# Patient Record
Sex: MALE | Race: WHITE
[De-identification: names, ages, dates, MRNs, and addresses within clinical notes are randomized per-mention and may not be internally consistent; named-entity substitution may affect disease eponyms.]

---
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
## 2021-08-29 NOTE — EDM.PDOC
ED HPI GENERAL MEDICAL PROBLEM





- General


Stated Complaint: Chest pain


Time Seen by Provider: 08/29/21 15:28





- History of Present Illness


INITIAL COMMENTS - FREE TEXT/NARRATIVE: 





Patient comes emergency department today from home with complaints of sharp sh

ooting stabbing heart pain that he has had for over a year.Patient has a history

of paranoid schizophrenia as well as antisocial personality disorder.  This 

patient for the last year has had intermittent episodes of sharp shooting 

stabbing midsternal anterior chest pain.  He has never been seen for this.  He 

sometimes has more pain like this when he goes to bed at night.  He noticed 

yesterday that he had this pain that was becoming a little bit more frequent.  

So after a year of having the symptoms for the first time he came to the 

emergency department today to have his sharp shooting stabbing chest pain 

evaluated that he has had for over a year.  He has no shortness of breath.  He 

only complains of some swelling to his lower extremities that he also has had 

for many years.  No SOB difficulty breathing. No fever no chills. No weakness 

dizziness lightheadedness. NO recent fall trauma or injury.  No cough or 

congestion.  No syncope.  No abdominal pain nausea or vomiting.  He denies any h

eartburn symptoms but his pain does get worse at night when he is laying down.  

He does have some chronic edema to his lower extremities that is not worse than 

normal.  He is somewhat difficult to obtain history on most likely due to his 

underlying psychiatric disorders.  He kind of ruminates about issues that have 

been going on for quite some time that are not new or acute.





- Related Data


                                    Allergies











Allergy/AdvReac Type Severity Reaction Status Date / Time


 


No Known Allergies Allergy   Verified 08/29/21 15:50











Home Meds: 


                                    Home Meds





Sertraline [Zoloft] 200 mg PO DAILY 11/20/20 [History]


busPIRone HCl [busPIRone] 30 mg PO BEDTIME 11/20/20 [History]


clonazePAM [Clonazepam] 1 mg PO TID 11/20/20 [History]


metFORMIN [Glucophage] 500 mg PO BIDMEALS 08/29/21 [History]











Past Medical History


Psychiatric History: Reports: Schizophrenia





Review of Systems





- Review of Systems


Review Of Systems: Comprehensive ROS is negative, except as noted in HPI.





ED EXAM, GENERAL





- Physical Exam


Exam: See Below


Exam Limited By: No Limitations


General Appearance: Alert, WD/WN, No Apparent Distress


Eye Exam: Bilateral Eye: EOMI


Head: Atraumatic, Normocephalic


Neck: Normal Inspection


Respiratory/Chest: No Respiratory Distress, Lungs Clear, Normal Breath Sounds, 

No Accessory Muscle Use, Chest Non-Tender (No signs of trauma bruising swelling 

ecchymosis of the chest)


Cardiovascular: Normal Peripheral Pulses, Regular Rate, Rhythm, No Murmur


Peripheral Pulses: 2+: Radial (L), Radial (R)


GI/Abdominal: Normal Bowel Sounds, Soft, Non-Tender


 (Male) Exam: Deferred


Rectal (Males) Exam: Deferred


Back Exam: Normal Inspection, Full Range of Motion


Extremities: Normal Inspection, Pedal Edema (He does have 2+ edema primarily to 

his feet bilaterally.  No tibial edema.  No signs of erythema induration 

swelling or infection.)


Neurological: Alert, Oriented, Normal Cognition, No Motor/Sensory Deficits


Psychiatric: Flat Affect


Skin Exam: Warm, Dry, Intact, Normal Color, No Rash





Course





- Vital Signs


Last Recorded V/S: 


                                Last Vital Signs











Temp  98.3 F   08/29/21 15:28


 


Pulse  89   08/29/21 15:28


 


Resp  18   08/29/21 15:28


 


BP  124/86   08/29/21 15:28


 


Pulse Ox  96   08/29/21 15:28














- Orders/Labs/Meds


Labs: 


                                Laboratory Tests











  08/29/21 08/29/21 Range/Units





  16:09 16:09 


 


WBC  5.6   (4.0-10.0)  x10^3/uL


 


RBC  4.86   (4.5-6.0)  x10^6/uL


 


Hgb  15.3   (14.0-18.0)  g/dL


 


Hct  43.0   (40.0-52.0)  %


 


MCV  88.5   (78.0-93.0)  fL


 


MCH  31.5   (26.0-32.0)  pg


 


MCHC  35.6   (32.0-36.0)  g/dL


 


RDW Coeff of Colt  12.1   (10.0-15.0)  %


 


Plt Count  99 L   (130-400)  x10^3/uL


 


Immature Gran % (Auto)  0.00   (0.00-0.43)  %


 


Neut % (Auto)  67.9   (50.0-80.0)  %


 


Lymph % (Auto)  20.7 L   (25.0-50.0)  %


 


Mono % (Auto)  7.7   (2.0-11.0)  %


 


Eos % (Auto)  3.2   (0.0-4.0)  %


 


Baso % (Auto)  0.5   (0.2-1.2)  %


 


Neut # (Auto)  3.8   (1.8-7.7)  x10^3/uL


 


Lymph # (Auto)  1.2   (1.0-4.8)  x10^3/uL


 


Mono # (Auto)  0.4   (0.0-0.8)  x10^3/uL


 


Eos # (Auto)  0.2   (0.0-0.5)  x10^3/uL


 


Baso # (Auto)  0.0   (0.0-0.2)  x10^3/uL


 


Immature Gran # (Auto)  0.00   (0.00-0.07)  x10^3/uL


 


Sodium   140  (136-145)  mmol/L


 


Potassium   3.9  (3.5-5.1)  mmol/L


 


Chloride   103  ()  mmol/L


 


Carbon Dioxide   28  (21-32)  mmol/L


 


Anion Gap   12.9  (5-15)  mmol/L


 


BUN   10  (7-18)  mg/dL


 


Creatinine   0.8  (0.70-1.30)  mg/dL


 


Est Cr Clr Drug Dosing   TNP  


 


Estimated GFR (MDRD)   > 60  


 


Glucose   159 H  (70-99)  mg/dL


 


Calcium   9.0  (8.5-10.1)  mg/dL


 


Corrected Calcium   9.1  (8.5-10.1)  mg/dL


 


Total Bilirubin   0.8  (0.2-1.0)  mg/dL


 


AST   27  (15-37)  U/L


 


ALT   52  (16-63)  U/L


 


Alkaline Phosphatase   67  ()  U/L


 


Troponin I High Sens   4  (<=76)  ng/L


 


Total Protein   6.8  (6.4-8.2)  g/dL


 


Albumin   3.9  (3.4-5.0)  g/dL


 


Globulin   2.9  


 


Albumin/Globulin Ratio   1.34  











Meds: 


Medications














Discontinued Medications














Generic Name Dose Route Start Last Admin





  Trade Name Freq  PRN Reason Stop Dose Admin


 


Al Hydroxide/Mg Hydroxide  30 ml  08/29/21 15:39  08/29/21 15:56





  Gi Cocktail Oral Solution 30 Ml  PO  08/29/21 15:40  30 ml





  ONETIME ONE   Administration














- Radiology Interpretation


Free Text/Narrative:: 





Chest x-ray per radiology shows no acute findings.  Mild to moderate convex left

 curvature centered in the upper thoracic spine and right curvature in the mid 

to lower thoracic spine.  Chronic bilateral rib fractures.





- Re-Assessments/Exams


Free Text/Narrative Re-Assessment/Exam: 





08/29/21 18:35


EKG is normal.





Chest x-ray is normal.





He does not have any symptoms of the stabbing chest pain that he has had for a 

year while he has been in the emergency department.  We did give him a GI 

cocktail and he has not had no recurrence of his pain.





I'm unsure of what is causing his 1 year stent of stabbing intermittent chest 

pain.  I do not see any acute emergent concerns at this time.  This could be 

multifactorial could be heartburn and/or GERD with his chronic medication use.  

I see no signs of pleurisy or other issues at this time.  Will try 

over-the-counter antacids if he has this recurrence of his pain.  Otherwise he 

needs to recheck with his primary care provider in the next week for all of his 

chronic issues that have been going on for years.  Discharge directions as below

 are explained to the patient is comfortable with this plan his questions are 

answered.





Departure





- Departure


Time of Disposition: 16:41


Disposition: Home, Self-Care 01


Clinical Impression: 


 Non-cardiac chest pain








- Discharge Information


Instructions:  Nonspecific Chest Pain, Adult, Easy-to-Read


Referrals: 


Garry Dueñas PA-C [Primary Care Provider] - 


Forms:  ED Department Discharge


Additional Instructions: 


Try OTC Maalox or Mylanta for acute episodes of the stabbing chest pain. 


Follow up with PCP next week for recheck of all your chronic issues and 

complaints. 


Return to the ED if new or worsening symptoms. 








Sepsis Event Note (ED)





- Focused Exam


Vital Signs: 


                                   Vital Signs











  Temp Pulse Resp BP Pulse Ox


 


 08/29/21 15:28  98.3 F  89  18  124/86  96

## 2021-08-29 NOTE — CR
______________________________________________________________________________   

  

[id number] RAD/RAD Chest PA And Lateral  

EXAM: FRONTAL AND LATERAL CHEST  

   

 INDICATION: CP FOR YEARS.  

   

 COMPARISON: None.  

   

 DISCUSSION: The heart and lungs are normal in appearance. Mild-to-moderate  

 convex left curvature centered in the upper thoracic spine and right curvature  

 in the mid to lower thoracic spine. Chronic bilateral rib fractures.  

   

 IMPRESSION:    

 1.  No acute findings.  

   

 Electronically signed by Ottoniel Spencer MD on 8/29/2021 4:05 PM  

   

  

Ottoniel Spencer MD                 

 08/29/21 1923    

  

Thank you for allowing us to participate in the care of your patient.

## 2021-08-29 NOTE — PCM.EKG
** #1 Interpretation


EKG Date: 08/29/21


Time: 15:33


Rhythm: NSR


Rate (Beats/Min): 82


Axis: LAD-Left Axis Deviation


P-Wave: Present


QRS: Normal


ST-T: Normal


QT: Normal


Comparison: NA - No Prior EKG

## 2022-08-17 ENCOUNTER — HOSPITAL ENCOUNTER (EMERGENCY)
Dept: HOSPITAL 50 - VM.ED | Age: 52
Discharge: HOME | End: 2022-08-17
Payer: MEDICAID

## 2022-08-17 DIAGNOSIS — E11.9: ICD-10-CM

## 2022-08-17 DIAGNOSIS — Z91.14: ICD-10-CM

## 2022-08-17 DIAGNOSIS — F20.89: Primary | ICD-10-CM

## 2022-08-17 DIAGNOSIS — Z79.899: ICD-10-CM

## 2022-08-17 DIAGNOSIS — Z79.84: ICD-10-CM
